# Patient Record
(demographics unavailable — no encounter records)

---

## 2024-11-06 NOTE — ASSESSMENT
[FreeTextEntry1] : This is a 60-year-old male with chronic bilateral hip pain and lumbar back pain. He underwent a left hip injection under fluoroscopy on 9/20/24 which provided him with 30% relief. He continues to have pain which is elicited with certain movement. He continues to have back pain, which he states is tolerable for him. He is currently managed with Percocet 7.5-325mg TID. He takes Diclofenac as needed. He will continue on his current dose as is for now. Patient will follow up in 4 weeks for reassessment. All this patients questions were answered and the conversation was understood well.  Entered by Nora Chairez, acting as scribe for Dr. Mancilla.  Documentation recorded by the scribe, in my presence, accurately reflects the service I personally performed, and the decisions made by me with my edits as appropriate.     Thank you for allowing me to assist in the management of this patient.     Best Regards,     Rebekah Mancilla M.D., FAAPMR     Diplomate, American Board of Physical Medicine and Rehabilitation Diplomate, American Board of Pain Medicine

## 2024-11-06 NOTE — HISTORY OF PRESENT ILLNESS
[FreeTextEntry1] : ORIGINAL PRESENTATION: This is a 60-year-old male with chronic low back pain, right hip and right elbow pain for which medical management continues. He denies any change in the level of area of his pain since his last encounter. He notes at least 30% reduction in pain with use of his medical regimen and defers injections at this time.  PRIOR PAIN TREATMENTS: Moderate relief with heat treatment. No relief with exercise. Lumbar epidural procedures (both LSNRI and caudal) with 50% relief, not sustained. Steroid injection R knee with minimal relief.  PATIENT PRESENTS FOR FOLLOW UP: Patient is a 60-year-old male who is here today for a follow up. He is under our care for bilateral hip pain and lumbar back pain. He underwent a left hip injection under fluoroscopy on 9/20/24 which provided him with 30% relief. He states it took about a day before the relief kicked in. He continues to experience a shooting pain with certain movements. He underwent a right total hip replacement by Dr. Adolfo Raines in the past.  He continues to have localized back pain and denies any radicular features into the lower extremities. He states that he does at home exercises and swims in his pool during the summertime. He is also working on weight loss. He has lost 9-10 lbs. Patient has wanted to hold off on injection therapy for his back.  He remains on a regimen of Percocet 7.5-325mg TID as needed. We have agreed to continue this current regimen for the time being. He was started on Diclofenac Sodium 75 mg BID prn for better pain control, which he states has been helping his pain when he takes it. We discussed that he is a high-risk patient due to being on a benzodiazepine.

## 2024-11-06 NOTE — DATA REVIEWED
[FreeTextEntry1] : LUMBAR MRI 09/2022 demonstrates developmentally low normal diameter spinal canal. Mild levoscoliosis with rotary component and preservation of sagittal lordosis. Multilevel spondylosis and facet arthropathy.  UDS: 05/2024 -Consistent.  [place] REGISTRY: Checked.

## 2024-11-06 NOTE — PHYSICAL EXAM
[Normal Coordination] : normal coordination [Normal DTR UE/LE] : normal DTR UE/LE  [Normal Sensation] : normal sensation [Normal Mood and Affect] : normal mood and affect [Oriented] : oriented [Able to Communicate] : able to communicate [Well Developed] : well developed [Well Nourished] : well nourished [Flexion] : flexion [Extension] : extension [Left] : left hip [] : negative straight leg raise

## 2024-12-04 NOTE — HISTORY OF PRESENT ILLNESS
[FreeTextEntry1] : ORIGINAL PRESENTATION: This is a 60-year-old male with chronic low back pain, right hip and right elbow pain for which medical management continues. He denies any change in the level of area of his pain since his last encounter. He notes at least 30% reduction in pain with use of his medical regimen and defers injections at this time.  PRIOR PAIN TREATMENTS: Moderate relief with heat treatment. No relief with exercise. Lumbar epidural procedures (both LSNRI and caudal) with 50% relief, not sustained. Steroid injection R knee with minimal relief.  PATIENT PRESENTS FOR FOLLOW UP: He is under our care for bilateral hip pain and lumbar back pain. He underwent a left hip injection under fluoroscopy on 9/20/24 which provided him with 30% relief. He states it took about a day before the relief kicked in. He continues to experience a shooting pain with certain movements but otherwise his pain is tolerable. He underwent a right total hip replacement by Dr. Adolfo Raines in the past.  He continues to have localized back pain and denies any radicular features into the lower extremities. He continues with at home exercises. He is also working on weight loss. He has lost 9-10 lbs. Patient has wanted to hold off on injection therapy for his back.  He remains on a regimen of Percocet 7.5-325mg TID as needed. We have agreed to continue this current regimen for the time being. He is aware that he is a high-risk patient due to being on a benzodiazepine. He takes Diclofenac Sodium 75 mg BID prn, which he states has been helping his pain when he takes it.

## 2024-12-04 NOTE — ASSESSMENT
[FreeTextEntry1] : This is a 60-year-old male with chronic bilateral hip pain and lumbar back pain. He underwent a left hip injection under fluoroscopy on 9/20/24 which provided him with 30% relief. He continues to have pain which is elicited with certain movement; however, his overall pain is tolerable. He continues to have back pain, which he states is stable. He is currently managed with Percocet 7.5-325mg TID. He takes Diclofenac as needed. He will continue on his current dose as is for now. I have refilled both medications for him today. Patient will follow up in 4 weeks for reassessment. All this patients questions were answered and the conversation was understood well.  I have consulted the  registry for the purpose of reviewing the patient's controlled substance.   Overall there is at least a 30-50% reduction in pain with the prescribed analgesics. The patient denies any adverse side effects due to the medication (sleeping disturbance, constipation, sleepiness, hallucinations and/or urination problems). There are no inconsistencies on urine toxicology screening which would necessitate an alteration of therapy. The patient will return to the office in 4 weeks' time and is aware to contact me with any question or concerns in the interim.  MANJINDER Hodge MD

## 2025-01-05 NOTE — DATA REVIEWED
[FreeTextEntry1] : LUMBAR MRI 09/2022 demonstrates developmentally low normal diameter spinal canal. Mild levoscoliosis with rotary component and preservation of sagittal lordosis. Multilevel spondylosis and facet arthropathy.  UDS: 10/9/24 - Consistent.  [place] REGISTRY: Checked.

## 2025-01-05 NOTE — ASSESSMENT
[FreeTextEntry1] : This is a 60-year-old male with chronic bilateral hip pain and lumbar back pain. He underwent a left hip injection under fluoroscopy on 9/20/24 which provided him with 30% relief. He continues to have pain which is elicited with certain movement; however, his overall pain is tolerable. He continues to have back pain which has returned back to baseline after an exacerbation last week. He is currently managed with Percocet 7.5-325mg TID. He takes Diclofenac as needed. Patient will follow up in 4 weeks for reassessment. All this patients questions were answered and the conversation was understood well.  I have consulted the  registry for the purpose of reviewing the patient's controlled substance.   Overall there is at least a 30-50% reduction in pain with the prescribed analgesics. The patient denies any adverse side effects due to the medication (sleeping disturbance, constipation, sleepiness, hallucinations and/or urination problems). There are no inconsistencies on urine toxicology screening which would necessitate an alteration of therapy. The patient will return to the office in 4 weeks' time and is aware to contact me with any question or concerns in the interim.  MANJINDER Hodge MD

## 2025-01-05 NOTE — HISTORY OF PRESENT ILLNESS
[FreeTextEntry1] : ORIGINAL PRESENTATION: This is a 60-year-old male with chronic low back pain, right hip and right elbow pain for which medical management continues. He denies any change in the level of area of his pain since his last encounter. He notes at least 30% reduction in pain with use of his medical regimen and defers injections at this time.  PRIOR PAIN TREATMENTS: Moderate relief with heat treatment. No relief with exercise. Lumbar epidural procedures (both LSNRI and caudal) with 50% relief, not sustained. Steroid injection R knee with minimal relief.  PATIENT PRESENTS FOR FOLLOW UP: He is under our care for bilateral hip pain and lumbar back pain. He underwent a left hip injection under fluoroscopy on 9/20/24 which provided him with 30% relief. He states it took about a day before the relief kicked in. He continues to experience a shooting pain with certain movements but otherwise his pain is tolerable. He underwent a right total hip replacement by Dr. Adolfo Raines in the past.  He continues to have localized back pain and denies any radicular features into the lower extremities. His pain exacerbated about one week ago after he lifted his mother due to a fall she had.  Over time, his pain has returned back to baseline. He continues with at home exercises. He continues to work on weight loss. Patient has wanted to hold off on injection therapy for his back.  He remains on a regimen of Percocet 7.5-325mg TID as needed. We have agreed to continue this current regimen for the time being. He is aware that he is a high-risk patient due to being on a benzodiazepine. He takes Diclofenac Sodium 75 mg BID prn, which he states has been helping his pain when he takes it.

## 2025-02-05 NOTE — END OF VISIT
[Time Spent: ___ minutes] : I have spent [unfilled] minutes of time on the encounter which excludes teaching and separately reported services. Intact

## 2025-02-07 NOTE — ASSESSMENT
[FreeTextEntry1] : This is a 60-year-old male with chronic bilateral hip pain and lumbar back pain. He underwent a left hip injection under fluoroscopy on 9/20/24 which provided him with 30% relief. He continues to have pain which is elicited with certain movement; however, his overall pain is tolerable. He is currently managed with Percocet 7.5-325mg TID. He takes Diclofenac as needed. He will see his PCP regarding his high BP. Patient will follow up in 4 weeks for reassessment. He will undergo a repeat UDS prior to his next office visit. All of this patient's questions were answered and the conversation was understood well.  I have consulted the  registry for the purpose of reviewing the patient's controlled substance.   Overall there is at least a 30-50% reduction in pain with the prescribed analgesics. The patient denies any adverse side effects due to the medication (sleeping disturbance, constipation, sleepiness, hallucinations and/or urination problems). The patient will return to the office in 4 weeks' time and is aware to contact me with any question or concerns in the interim.  ATTESTATION: I personally reviewed with the PA, this patient's history and physical exam findings, as documented above. I have discussed the relevant areas of concern, having direct implications to the presenting problems and illnesses, and I have personally examined all pertinent and positive and negative findings, which impact their pain management treatment.   I, Dr. Mancilla, attest that this documentation has been prepared by Joslyn Feldman PA-C under my presence and direction.  MANJINDER Hodge MD

## 2025-02-07 NOTE — HISTORY OF PRESENT ILLNESS
[FreeTextEntry1] : ORIGINAL PRESENTATION: This is a 60-year-old male with chronic low back pain, right hip and right elbow pain for which medical management continues. He denies any change in the level of area of his pain since his last encounter. He notes at least 30% reduction in pain with use of his medical regimen and defers injections at this time.  PRIOR PAIN TREATMENTS: Moderate relief with heat treatment. No relief with exercise. Lumbar epidural procedures (both LSNRI and caudal) with 50% relief, not sustained. Steroid injection R knee with minimal relief.  PATIENT PRESENTS FOR FOLLOW UP: He is under our care for bilateral hip pain and lumbar back pain. He underwent a left hip injection under fluoroscopy on 9/20/24 which provided him with 30% relief. He states it took about a day before the relief kicked in. He continues to experience a shooting pain with certain movements but otherwise his pain is tolerable. He underwent a right total hip replacement by Dr. Adolfo Raines in the past.  He continues to have localized back pain and denies any radicular features into the lower extremities. His exacerbated pain which he complained of on his last visit has settled down. He continues with at home exercises. He continues to work on weight loss. Patient has wanted to hold off on injection therapy for his back.  He remains on a regimen of Percocet 7.5-325mg TID as needed. We have agreed to continue this current regimen for the time being. He is aware that he is a high-risk patient due to being on a benzodiazepine. He takes Diclofenac Sodium 75 mg BID prn, which he states has been helping his pain when he takes it.   Of note, patient's BP is elevated today (150/95). He states he has been getting high readings at home as well. His BP has been high throughout the year. He was previously advised to see his PCP regarding this issue in the past. He is scheduled to see his PCP next week for evaluation.

## 2025-02-27 NOTE — HISTORY OF PRESENT ILLNESS
[FreeTextEntry1] : 60 htn ronaldo not use C-Pap. To get oral device. Seeing ENT being tested for allergy. Hx Gregory palsy. He may need his other hip THR. He gets chest pain in am . Tried work out light weights. He got chest pain

## 2025-02-27 NOTE — ASSESSMENT
[FreeTextEntry1] : 60 htn ronaldo not use C-Pap. To get oral device. Seeing ENT being tested for allergy. Hx Normanna palsy. He may need his other hip THR. He gets chest pain in am . Tried work out light weights. He got chest pain. He sees Jason SANDOVAL. Told chol high. . He has chest pain. EKG abnormal. He needs a adenocard thallium and echo. Will check blood. If still high start  statin EKG reviewed. He needs weight loss

## 2025-02-27 NOTE — REVIEW OF SYSTEMS
[Hearing Loss] : hearing loss [Nocturia] : nocturia [Joint Pain] : joint pain [Hip Pain] : hip pain [Fever] : no fever [Chills] : no chills [Blurry Vision] : no blurred vision [Dyspnea on exertion] : not dyspnea during exertion [Palpitations] : no palpitations [Wheezing] : no wheezing [Abdominal Pain] : no abdominal pain [Rash] : no rash [Dizziness] : no dizziness [Confusion] : no confusion was observed [Easy Bleeding] : no tendency for easy bleeding

## 2025-03-05 NOTE — ASSESSMENT
[FreeTextEntry1] : This is a 60-year-old male with chronic bilateral hip pain and lumbar back pain. He underwent a left hip injection under fluoroscopy on 9/20/24 which provided him with 30% relief. He continues to have pain which is elicited with certain movement. He states he is thinking about undergoing surgery for his left hip in the future. He is currently managed with Percocet 7.5-325mg TID. He takes Diclofenac as needed. Patient will follow up in 4 weeks for reassessment. All of this patient's questions were answered and the conversation was understood well.  I have consulted the  registry for the purpose of reviewing the patient's controlled substance.   Overall there is at least a 30-50% reduction in pain with the prescribed analgesics. The patient denies any adverse side effects due to the medication (sleeping disturbance, constipation, sleepiness, hallucinations and/or urination problems). The patient will return to the office in 4 weeks' time and is aware to contact me with any question or concerns in the interim.  ATTESTATION: I personally reviewed with the PA, this patient's history and physical exam findings, as documented above. I have discussed the relevant areas of concern, having direct implications to the presenting problems and illnesses, and I have personally examined all pertinent and positive and negative findings, which impact their pain management treatment.   I, Dr. Mancilla, attest that this documentation has been prepared by Joslyn Feldman PA-C under my presence and direction.  MANJINDER Hodge MD

## 2025-03-05 NOTE — DATA REVIEWED
[FreeTextEntry1] : LUMBAR MRI 09/2022 demonstrates developmentally low normal diameter spinal canal. Mild levoscoliosis with rotary component and preservation of sagittal lordosis. Multilevel spondylosis and facet arthropathy.  UDS: 2/2025 - Consistent.  [place] REGISTRY: Checked.

## 2025-03-05 NOTE — HISTORY OF PRESENT ILLNESS
[FreeTextEntry1] : ORIGINAL PRESENTATION: This is a 60-year-old male with chronic low back pain, right hip and right elbow pain for which medical management continues. He denies any change in the level of area of his pain since his last encounter. He notes at least 30% reduction in pain with use of his medical regimen and defers injections at this time.  PRIOR PAIN TREATMENTS: Moderate relief with heat treatment. No relief with exercise. Lumbar epidural procedures (both LSNRI and caudal) with 50% relief, not sustained. Steroid injection R knee with minimal relief.  PATIENT PRESENTS FOR FOLLOW UP: He is under our care for bilateral hip pain and lumbar back pain. He underwent a left hip injection under fluoroscopy on 9/20/24 which provided him with 30% relief. He states it took about a day before the relief kicked in. He continues to experience a shooting pain with certain movements. He is thinking about proceeding with a left THR in the future. He underwent a right total hip replacement by Dr. Adolfo Raines in the past.  He continues to have localized back pain and denies any radicular features into the lower extremities. His exacerbated pain which he complained of on his last visit has settled down. He continues with at home exercises. He continues to work on weight loss. Patient has wanted to hold off on injection therapy for his back.  He remains on a regimen of Percocet 7.5-325mg TID as needed. We have agreed to continue this current regimen for the time being. He is aware that he is a high-risk patient due to being on a benzodiazepine. He takes Diclofenac Sodium 75 mg BID prn, which he states has been helping his pain when he takes it.   Patient recently had Bell's Palsy. He is seeing a cardiologist for his high BP and chest pain that he has been having.

## 2025-03-21 NOTE — HISTORY OF PRESENT ILLNESS
[de-identified] : Patient is a 60-year-old male here for evaluation of left hip and left knee.  Patient has history of right total hip replacement.  patient has been having pain for about 2 years without any injury or trauma.  Patient states at this point his left hip pain is the worst, worsens with activities.  Left knee pain comes and goes denies numbness tingling, denies instability  Left hip exam no effusion no ecchymosis no erythema no tense palpation decreased range of motion with guarding pain to groin no gross instability neurovascular intact good strength with discomfort  Left knee exam: No effusion no ecchymosis no erythema tenderness palpation the medial joint line range of motion 0 degrees to 115 degrees with patella crepitus no gross instability neurovascular intact calf soft nontender  X-ray left hip 2 views: No acute fractures, subluxations, dislocations.  Advanced bone-on-bone osteoarthritis left hip  X-ray left knee 3 views: No acute fractures, subluxations, dislocations.  Mild to moderate degenerative changes  Discussed with patient detail he does have severe osteoarthritis of left hip, mild osteoarthritis to the left knee.  Discussed with patient that it is likely that most of his left knee pain is referring from his left hip due to compensation.  Discussed treatment options in detail.  Discussed conservative methods and surgical methods.  Plan is for total hip replacement left hip  We discussed the surgery, including a description of the surgery in layman's terms, preoperative evaluation, the hospital stay, anesthesia, and expected outcome.     Models equivalent to the actual hip replacement prosthesis were used to demonstrate the magnitude and scope of the surgery.  Various types of implant fixation including cement and biologic fixation were described.  The patient shared in the decision making and agreed to the use of implants.    Additionally surgical risks were discussed.  The patient has good understanding of the inherent risks of surgery which include bleeding, pain, and infection, blood clots, and any medical issues that may arise in the perioperative period.  Additionally, we discussed risks uniquely pertinent to hip replacement surgery, including leg length discrepancy, instability, loosening of components, numbness around the incision, nerve palsy, and possible need for revision surgery should the replacement not function optimally.  All questions were answered and the patient verbalized understanding.  I encouraged the patient to contact me should any further questions are issues arise.      Patient will require a rolling walker 2-4 weeks post-op for ambulation and ADLs   Patient will also require commode as they are confined to one level without a bathroom   Patient is able to safely use a walker and functional mobility deficit can be sufficiently resolved with use of walker   Case was discussed with Dr. Melara

## 2025-04-02 NOTE — ASSESSMENT
[FreeTextEntry1] : This is a 60-year-old male with chronic bilateral hip pain and lumbar back pain. He underwent a left hip injection under fluoroscopy on 9/20/24 which provided him with 30% relief. He continues to have pain which is elicited with certain movement. He saw orthopedics and is scheduled to undergo a L THR on 4/30/25. He is currently managed with Percocet 7.5-325mg TID. He takes Diclofenac as needed. Both medications were refilled today. Patient will follow up in 4 weeks via TELEHEALTH for reassessment. All of this patient's questions were answered and the conversation was understood well.  I have consulted the  registry for the purpose of reviewing the patient's controlled substance.   Overall there is at least a 30-50% reduction in pain with the prescribed analgesics. The patient denies any adverse side effects due to the medication (sleeping disturbance, constipation, sleepiness, hallucinations and/or urination problems). The patient will return to the office in 4 weeks' time and is aware to contact me with any question or concerns in the interim.  ATTESTATION: I personally reviewed with the PA, this patient's history and physical exam findings, as documented above. I have discussed the relevant areas of concern, having direct implications to the presenting problems and illnesses, and I have personally examined all pertinent and positive and negative findings, which impact their pain management treatment.   I, Dr. Mancilla, attest that this documentation has been prepared by Joslyn Feldman PA-C under my presence and direction.  MANJINDER Hodge MD

## 2025-04-02 NOTE — HISTORY OF PRESENT ILLNESS
[FreeTextEntry1] : ORIGINAL PRESENTATION: This is a 60-year-old male with chronic low back pain, right hip and right elbow pain for which medical management continues. He denies any change in the level of area of his pain since his last encounter. He notes at least 30% reduction in pain with use of his medical regimen and defers injections at this time.  PRIOR PAIN TREATMENTS: Moderate relief with heat treatment. No relief with exercise. Lumbar epidural procedures (both LSNRI and caudal) with 50% relief, not sustained. Steroid injection R knee with minimal relief.  PATIENT PRESENTS FOR FOLLOW UP: He is under our care for bilateral hip pain and lumbar back pain. He underwent a left hip injection under fluoroscopy on 9/20/24 which provided him with 30% relief. He states it took about a day before the relief kicked in. He continues to experience a shooting pain with certain movements. He recently saw orthopedics and is scheduled for a left THR on 4/30/25. He underwent a right total hip replacement by Dr. Adolfo Raines in the past.  He continues to have localized back pain and denies any radicular features into the lower extremities. He continues with at home exercises. He continues to work on weight loss. Patient has wanted to hold off on injection therapy for his back.  He remains on a regimen of Percocet 7.5-325mg TID as needed. We have agreed to continue this current regimen for the time being. He is aware that he is a high-risk patient due to being on a benzodiazepine. He takes Diclofenac Sodium 75 mg BID prn, which he states has been helping his pain when he takes it.   He has recovered from Bell's Palsy.

## 2025-05-16 NOTE — HISTORY OF PRESENT ILLNESS
[de-identified] : s/p Left KATLIN doing well postop course uncomplicated, home PT complete, progressing appropriately.  pain controlled (-)n/v/cp/sob  Left hip exam shows well healed incision NTTP No pain with PROM  Imaging:  AP and Lateral views were obtained of the hips, including the contralateral right hip for comparison and assessment of leg length. X-rays are negative for acute bone or soft tissue trauma. Left hip replacement in good alignment without radiographic evidence of complication.  Plan: continue home exercise activities as tolerated f/u at 6 weeks.

## 2025-06-04 NOTE — REASON FOR VISIT
Patient called and LM stating she faxed paperwork for FMLA over on 8/9/24. Patient is calling for an update. Routed to Elana Aldrich RN    [Follow-Up Visit] : a follow-up pain management visit

## 2025-06-04 NOTE — ASSESSMENT
[FreeTextEntry1] : This is a 60-year-old male with chronic bilateral hip pain and lumbar back pain. He underwent a Left THR s/p 4/30/25. He is doing well postoperatively. He is currently managed with Percocet 7.5-325mg TID. He takes Diclofenac as needed. Both medications were refilled today. Patient will follow up in 4 weeks for reassessment. All of this patient's questions were answered and the conversation was understood well.  I have consulted the [facility] registry for the purpose of reviewing the patient's controlled substance.   Overall there is at least a 30-50% reduction in pain with the prescribed analgesics. The patient denies any adverse side effects due to the medication (sleeping disturbance, constipation, sleepiness, hallucinations and/or urination problems). The patient will return to the office in 4 weeks' time and is aware to contact me with any question or concerns in the interim.  ATTESTATION: I personally reviewed with the PA, this patient's history and physical exam findings, as documented above. I have discussed the relevant areas of concern, having direct implications to the presenting problems and illnesses, and I have personally examined all pertinent and positive and negative findings, which impact their pain management treatment.   I, Dr. Mancilla, attest that this documentation has been prepared by Joslyn Feldman PA-C under my presence and direction.  MANJINDER Hodge MD

## 2025-06-04 NOTE — HISTORY OF PRESENT ILLNESS
[FreeTextEntry1] : ORIGINAL PRESENTATION: This is a 60-year-old male with chronic low back pain, right hip and right elbow pain for which medical management continues. He denies any change in the level of area of his pain since his last encounter. He notes at least 30% reduction in pain with use of his medical regimen and defers injections at this time.  PRIOR PAIN TREATMENTS: Moderate relief with heat treatment. No relief with exercise. Lumbar epidural procedures (both LSNRI and caudal) with 50% relief, not sustained. Steroid injection R knee with minimal relief.  PATIENT PRESENTS FOR FOLLOW UP: He is under our care for bilateral hip pain and lumbar back pain. He underwent a Left THR s/p 4/30/25. He is doing well postoperatively. Walking with a cane today for stability. He continues to have localized back pain and denies any radicular features into the lower extremities. Patient has wanted to hold off on injection therapy for his back. His right hip is stable at this time.  He remains on a regimen of Percocet 7.5-325mg TID as needed. We have agreed to continue this current regimen for the time being. He is aware that he is a high-risk patient due to being on a benzodiazepine. He takes Diclofenac Sodium 75 mg BID prn, which he states has been helping his pain when he takes it.

## 2025-07-02 NOTE — HISTORY OF PRESENT ILLNESS
[FreeTextEntry1] : ORIGINAL PRESENTATION: This is a 60-year-old male with chronic low back pain, right hip and right elbow pain for which medical management continues. He denies any change in the level of area of his pain since his last encounter. He notes at least 30% reduction in pain with use of his medical regimen and defers injections at this time.  PRIOR PAIN TREATMENTS: Moderate relief with heat treatment. No relief with exercise. Lumbar epidural procedures (both LSNRI and caudal) with 50% relief, not sustained. Steroid injection R knee with minimal relief.  PATIENT PRESENTS FOR FOLLOW UP: He is under our care for bilateral hip pain and lumbar back pain. He underwent a Left THR s/p 4/30/25. He has healed very well. He is not having any hip pain. Walking without a cane today. He continues to have localized back pain and denies any radicular features into the lower extremities. Patient has wanted to hold off on injection therapy for his back. His right hip is stable at this time.  He remains on a regimen of Percocet 7.5-325mg TID as needed. Patient states he wishes to start weaning off of the medication. I will decrease it to 5 mg TID. He takes Diclofenac Sodium 75 mg BID prn, which he states has been helping his pain when he takes it.

## 2025-07-02 NOTE — ASSESSMENT
[FreeTextEntry1] : This is a 60-year-old male with chronic bilateral hip pain and lumbar back pain. He underwent a Left THR s/p 4/30/25 and has recovered well from it. We spoke about weaning him off of Percocet. Today, I am decreasing him to Percocet 5-325mg TID. He defers a Clonidine patch for any withdrawal symptoms he may have. He takes Diclofenac as needed. Patient will follow up in 4 weeks for reassessment. All of this patient's questions were answered and the conversation was understood well.  I have consulted the  registry for the purpose of reviewing the patient's controlled substance.  The patient will return to the office in 4 weeks' time and is aware to contact me with any question or concerns in the interim.  ATTESTATION: I personally reviewed with the PA, this patient's history and physical exam findings, as documented above. I have discussed the relevant areas of concern, having direct implications to the presenting problems and illnesses, and I have personally examined all pertinent and positive and negative findings, which impact their pain management treatment.   I, Dr. Mancilla, attest that this documentation has been prepared by Joslyn Feldman PA-C under my presence and direction.  MANJINDER Hodge MD